# Patient Record
Sex: MALE | Race: WHITE | NOT HISPANIC OR LATINO
[De-identification: names, ages, dates, MRNs, and addresses within clinical notes are randomized per-mention and may not be internally consistent; named-entity substitution may affect disease eponyms.]

---

## 2021-08-12 ENCOUNTER — TRANSCRIPTION ENCOUNTER (OUTPATIENT)
Age: 46
End: 2021-08-12

## 2022-09-09 ENCOUNTER — NON-APPOINTMENT (OUTPATIENT)
Age: 47
End: 2022-09-09

## 2024-05-15 ENCOUNTER — NON-APPOINTMENT (OUTPATIENT)
Age: 49
End: 2024-05-15

## 2024-05-16 ENCOUNTER — EMERGENCY (EMERGENCY)
Facility: HOSPITAL | Age: 49
LOS: 0 days | Discharge: HOME | End: 2024-05-17
Attending: EMERGENCY MEDICINE
Payer: COMMERCIAL

## 2024-05-16 VITALS
RESPIRATION RATE: 18 BRPM | TEMPERATURE: 98 F | SYSTOLIC BLOOD PRESSURE: 161 MMHG | DIASTOLIC BLOOD PRESSURE: 79 MMHG | HEART RATE: 103 BPM | OXYGEN SATURATION: 99 % | WEIGHT: 175.93 LBS

## 2024-05-16 DIAGNOSIS — R00.2 PALPITATIONS: ICD-10-CM

## 2024-05-16 DIAGNOSIS — R79.89 OTHER SPECIFIED ABNORMAL FINDINGS OF BLOOD CHEMISTRY: ICD-10-CM

## 2024-05-16 LAB
ALBUMIN SERPL ELPH-MCNC: 4.7 G/DL — SIGNIFICANT CHANGE UP (ref 3.5–5.2)
ALP SERPL-CCNC: 69 U/L — SIGNIFICANT CHANGE UP (ref 30–115)
ALT FLD-CCNC: 25 U/L — SIGNIFICANT CHANGE UP (ref 0–41)
ANION GAP SERPL CALC-SCNC: 13 MMOL/L — SIGNIFICANT CHANGE UP (ref 7–14)
AST SERPL-CCNC: 18 U/L — SIGNIFICANT CHANGE UP (ref 0–41)
BASOPHILS # BLD AUTO: 0.06 K/UL — SIGNIFICANT CHANGE UP (ref 0–0.2)
BASOPHILS NFR BLD AUTO: 0.5 % — SIGNIFICANT CHANGE UP (ref 0–1)
BILIRUB SERPL-MCNC: 1.3 MG/DL — HIGH (ref 0.2–1.2)
BUN SERPL-MCNC: 10 MG/DL — SIGNIFICANT CHANGE UP (ref 10–20)
CALCIUM SERPL-MCNC: 9.8 MG/DL — SIGNIFICANT CHANGE UP (ref 8.4–10.4)
CHLORIDE SERPL-SCNC: 102 MMOL/L — SIGNIFICANT CHANGE UP (ref 98–110)
CO2 SERPL-SCNC: 25 MMOL/L — SIGNIFICANT CHANGE UP (ref 17–32)
CREAT SERPL-MCNC: 1 MG/DL — SIGNIFICANT CHANGE UP (ref 0.7–1.5)
EGFR: 92 ML/MIN/1.73M2 — SIGNIFICANT CHANGE UP
EOSINOPHIL # BLD AUTO: 0.05 K/UL — SIGNIFICANT CHANGE UP (ref 0–0.7)
EOSINOPHIL NFR BLD AUTO: 0.4 % — SIGNIFICANT CHANGE UP (ref 0–8)
GLUCOSE SERPL-MCNC: 89 MG/DL — SIGNIFICANT CHANGE UP (ref 70–99)
HCT VFR BLD CALC: 46.9 % — SIGNIFICANT CHANGE UP (ref 42–52)
HGB BLD-MCNC: 16.3 G/DL — SIGNIFICANT CHANGE UP (ref 14–18)
IMM GRANULOCYTES NFR BLD AUTO: 0.8 % — HIGH (ref 0.1–0.3)
LYMPHOCYTES # BLD AUTO: 1.38 K/UL — SIGNIFICANT CHANGE UP (ref 1.2–3.4)
LYMPHOCYTES # BLD AUTO: 11.6 % — LOW (ref 20.5–51.1)
MAGNESIUM SERPL-MCNC: 2.3 MG/DL — SIGNIFICANT CHANGE UP (ref 1.8–2.4)
MCHC RBC-ENTMCNC: 30.4 PG — SIGNIFICANT CHANGE UP (ref 27–31)
MCHC RBC-ENTMCNC: 34.8 G/DL — SIGNIFICANT CHANGE UP (ref 32–37)
MCV RBC AUTO: 87.5 FL — SIGNIFICANT CHANGE UP (ref 80–94)
MONOCYTES # BLD AUTO: 0.76 K/UL — HIGH (ref 0.1–0.6)
MONOCYTES NFR BLD AUTO: 6.4 % — SIGNIFICANT CHANGE UP (ref 1.7–9.3)
NEUTROPHILS # BLD AUTO: 9.55 K/UL — HIGH (ref 1.4–6.5)
NEUTROPHILS NFR BLD AUTO: 80.3 % — HIGH (ref 42.2–75.2)
NRBC # BLD: 0 /100 WBCS — SIGNIFICANT CHANGE UP (ref 0–0)
NT-PROBNP SERPL-SCNC: <36 PG/ML — SIGNIFICANT CHANGE UP (ref 0–300)
PLATELET # BLD AUTO: 245 K/UL — SIGNIFICANT CHANGE UP (ref 130–400)
PMV BLD: 10.9 FL — HIGH (ref 7.4–10.4)
POTASSIUM SERPL-MCNC: 4.2 MMOL/L — SIGNIFICANT CHANGE UP (ref 3.5–5)
POTASSIUM SERPL-SCNC: 4.2 MMOL/L — SIGNIFICANT CHANGE UP (ref 3.5–5)
PROT SERPL-MCNC: 7.3 G/DL — SIGNIFICANT CHANGE UP (ref 6–8)
RBC # BLD: 5.36 M/UL — SIGNIFICANT CHANGE UP (ref 4.7–6.1)
RBC # FLD: 12.4 % — SIGNIFICANT CHANGE UP (ref 11.5–14.5)
SODIUM SERPL-SCNC: 140 MMOL/L — SIGNIFICANT CHANGE UP (ref 135–146)
TROPONIN T, HIGH SENSITIVITY RESULT: 7 NG/L — SIGNIFICANT CHANGE UP (ref 6–21)
TROPONIN T, HIGH SENSITIVITY RESULT: 7 NG/L — SIGNIFICANT CHANGE UP (ref 6–21)
WBC # BLD: 11.9 K/UL — HIGH (ref 4.8–10.8)
WBC # FLD AUTO: 11.9 K/UL — HIGH (ref 4.8–10.8)

## 2024-05-16 PROCEDURE — 83735 ASSAY OF MAGNESIUM: CPT

## 2024-05-16 PROCEDURE — 83880 ASSAY OF NATRIURETIC PEPTIDE: CPT

## 2024-05-16 PROCEDURE — 84484 ASSAY OF TROPONIN QUANT: CPT

## 2024-05-16 PROCEDURE — 75574 CT ANGIO HRT W/3D IMAGE: CPT | Mod: MC

## 2024-05-16 PROCEDURE — 71045 X-RAY EXAM CHEST 1 VIEW: CPT

## 2024-05-16 PROCEDURE — 71045 X-RAY EXAM CHEST 1 VIEW: CPT | Mod: 26

## 2024-05-16 PROCEDURE — 93010 ELECTROCARDIOGRAM REPORT: CPT

## 2024-05-16 PROCEDURE — 99223 1ST HOSP IP/OBS HIGH 75: CPT

## 2024-05-16 PROCEDURE — G0378: CPT

## 2024-05-16 PROCEDURE — 99285 EMERGENCY DEPT VISIT HI MDM: CPT | Mod: 25

## 2024-05-16 PROCEDURE — 80053 COMPREHEN METABOLIC PANEL: CPT

## 2024-05-16 PROCEDURE — 36415 COLL VENOUS BLD VENIPUNCTURE: CPT

## 2024-05-16 PROCEDURE — 85025 COMPLETE CBC W/AUTO DIFF WBC: CPT

## 2024-05-16 PROCEDURE — 93005 ELECTROCARDIOGRAM TRACING: CPT

## 2024-05-16 RX ORDER — SODIUM CHLORIDE 9 MG/ML
1000 INJECTION, SOLUTION INTRAVENOUS ONCE
Refills: 0 | Status: COMPLETED | OUTPATIENT
Start: 2024-05-16 | End: 2024-05-16

## 2024-05-16 RX ADMIN — SODIUM CHLORIDE 1000 MILLILITER(S): 9 INJECTION, SOLUTION INTRAVENOUS at 16:17

## 2024-05-16 NOTE — ED CDU PROVIDER INITIAL DAY NOTE - OBJECTIVE STATEMENT
49-year-old male no past medical history had an episodes of fluttering in his chest yesterday decided to take his blood pressure noticed his heart rate was initially 160 and then repeated and was lower decided to go to an urgent care today and they sent him to the ED for further evaluation.

## 2024-05-16 NOTE — ED CDU PROVIDER INITIAL DAY NOTE - ATTENDING APP SHARED VISIT CONTRIBUTION OF CARE
49-year-old male no past medical history had an episodes of fluttering in his chest yesterday decided to take his blood pressure noticed his heart rate was initially 160 and then repeated and was lower decided to go to an urgent care today and they sent him to the ED for further evaluation.  Denies currently any chest pain shortness of breath nausea vomiting or abdominal pain non-smoker no previous cardiac workup vs reviewed labs imaging obtained and reviewed. ED EKG: my independent interpretation - Dr. Ton Stinson : [NSR, nl axis, nl intervals, no ST elevation] ED CXR prelim, my independent interpretation - Dr. Ton Stinson: [No PTX, No infiltrates, No Free air] high sensitivity troponin x 2 7. Patient placed in obs for further cardiac workup.  Plan for ccta in the morning   CONSTITUTIONAL: WA / WN / NAD  	HEAD: NCAT  	EYES: PERRL; EOMI;   	ENT: Normal pharynx; mucous membranes pink/moist, no erythema.  	NECK: Supple; no meningeal signs  	CARD: RRR; nl S1/S2; no M/R/G.  	RESP: Respiratory rate and effort are normal; breath sounds clear and equal bilaterally.  	ABD: Soft, NT ND   	MSK/EXT: No gross deformities; full range of motion.  	SKIN: Warm and dry;   	NEURO: AAOx3  PSYCH: Memory Intact, Normal Affect

## 2024-05-16 NOTE — ED PROVIDER NOTE - NSTIMEPROVIDERCAREINITIATE_GEN_ER
16-May-2024 15:15 I have personally performed a face to face diagnostic evaluation on this patient. I have reviewed the ACP note and agree with the history, exam and plan of care, except as noted.

## 2024-05-16 NOTE — ED CDU PROVIDER INITIAL DAY NOTE - CLINICAL SUMMARY MEDICAL DECISION MAKING FREE TEXT BOX
49-year-old male no past medical history had an episodes of fluttering in his chest yesterday decided to take his blood pressure noticed his heart rate was initially 160 and then repeated and was lower decided to go to an urgent care today and they sent him to the ED for further evaluation.  Denies currently any chest pain shortness of breath nausea vomiting or abdominal pain non-smoker no previous cardiac workup vs reviewed labs imaging obtained and reviewed. ED EKG: my independent interpretation - Dr. Ton Stinson : [NSR, nl axis, nl intervals, no ST elevation] ED CXR prelim, my independent interpretation - Dr. Ton Stinson: [No PTX, No infiltrates, No Free air] high sensitivity troponin x 2 7. Patient placed in obs for further cardiac workup.  Plan for ccta in the morning

## 2024-05-16 NOTE — ED ADULT TRIAGE NOTE - CHIEF COMPLAINT QUOTE
Patient in NAD a+ox3 states yesterday "felt fluttering in my chest for few seconds" and today had abnormal EKG at urgent care sent to ED

## 2024-05-16 NOTE — ED CDU PROVIDER INITIAL DAY NOTE - WHICH SHOWED
ED EKG: my independent interpretation - Dr. Ton Stinson : [NSR, nl axis, nl intervals, no ST elevation] ED CXR prelim, my independent interpretation - Dr. Ton Stinson: [No PTX, No infiltrates, No Free air]

## 2024-05-16 NOTE — ED PROVIDER NOTE - EKG/XRAY ADDITIONAL INFORMATION
ED CXR prelim, my independent interpretation - Dr. Ton Stinson: [No PTX, No infiltrates, No Free air] ED EKG: my independent interpretation - Dr. Ton Stinson : [NSR, nl axis, nl intervals, no ST elevation]

## 2024-05-16 NOTE — ED CDU PROVIDER INITIAL DAY NOTE - PHYSICAL EXAMINATION
CONSTITUTIONAL: WA / WN / NAD  	HEAD: NCAT  	EYES: PERRL; EOMI;   	ENT: Normal pharynx; mucous membranes pink/moist, no erythema.  	NECK: Supple; no meningeal signs  	CARD: RRR; nl S1/S2; no M/R/G.  	RESP: Respiratory rate and effort are normal; breath sounds clear and equal bilaterally.  	ABD: Soft, NT ND   	MSK/EXT: No gross deformities; full range of motion.  	SKIN: Warm and dry;   	NEURO: AAOx3  PSYCH: Memory Intact, Normal Affect

## 2024-05-16 NOTE — ED PROVIDER NOTE - OBJECTIVE STATEMENT
49-year-old male no past medical history had an episodes of fluttering in his chest yesterday decided to take his blood pressure noticed his heart rate was initially 160 and then repeated and was lower decided to go to an urgent care today and they sent him to the ED for further evaluation.  Denies currently any chest pain shortness of breath nausea vomiting or abdominal pain non-smoker no previous cardiac workup

## 2024-05-16 NOTE — ED PROVIDER NOTE - CLINICAL SUMMARY MEDICAL DECISION MAKING FREE TEXT BOX
49-year-old male no past medical history had an episodes of fluttering in his chest yesterday decided to take his blood pressure noticed his heart rate was initially 160 and then repeated and was lower decided to go to an urgent care today and they sent him to the ED for further evaluation.  Denies currently any chest pain shortness of breath nausea vomiting or abdominal pain non-smoker no previous cardiac workup vs reviewed labs imaging obtained and reviewed. ED EKG: my independent interpretation - Dr. Ton Stinson : [NSR, nl axis, nl intervals, no ST elevation] ED CXR prelim, my independent interpretation - Dr. Ton Stinson: [No PTX, No infiltrates, No Free air] high sensitivity troponin x 2 7. Patient placed in obs for further cardiac workup.

## 2024-05-17 VITALS — HEART RATE: 56 BPM

## 2024-05-17 PROBLEM — Z00.00 ENCOUNTER FOR PREVENTIVE HEALTH EXAMINATION: Status: ACTIVE | Noted: 2024-05-17

## 2024-05-17 PROCEDURE — 99239 HOSP IP/OBS DSCHRG MGMT >30: CPT

## 2024-05-17 PROCEDURE — 75574 CT ANGIO HRT W/3D IMAGE: CPT | Mod: 26,MC

## 2024-05-17 RX ORDER — METOPROLOL TARTRATE 50 MG
100 TABLET ORAL ONCE
Refills: 0 | Status: COMPLETED | OUTPATIENT
Start: 2024-05-17 | End: 2024-05-17

## 2024-05-17 RX ADMIN — Medication 100 MILLIGRAM(S): at 07:40

## 2024-05-17 NOTE — ED CDU PROVIDER DISPOSITION NOTE - PROVIDER TOKENS
PROVIDER:[TOKEN:[[id number]],FOLLOWUP:[1-3 Days]],PROVIDER:[TOKEN:[[id number]],FOLLOWUP:[4-6 Days]]

## 2024-05-17 NOTE — ED CDU PROVIDER DISPOSITION NOTE - CARE PROVIDER_API CALL
Nate Tran  Internal Medicine  [address], [address]  [place] [address]  Phone: [phone]  Fax: [phone]  Follow Up Time: 1-3 Days    Wade Cardenas  Interventional Cardiology  [address] [place] [address], [address]  [place] [address]  Phone: [phone]  Fax: [phone]  Follow Up Time: 4-6 Days

## 2024-05-17 NOTE — ED CDU PROVIDER DISPOSITION NOTE - CARE PROVIDERS DIRECT ADDRESSES
[email or web address]

## 2024-05-17 NOTE — ED CDU PROVIDER DISPOSITION NOTE - CLINICAL COURSE
pt presenting for eval of cp/palps. labs ekg imaging reviewed. Aware of all results, given a copy of all available results, comfortable with discharge and follow-up outpatient, strict return precautions given. Endorses understanding of all of this and aware that they can return at any time for new or concerning symptoms. No further questions or concerns at this time

## 2024-05-17 NOTE — ED CDU PROVIDER SUBSEQUENT DAY NOTE - PROGRESS NOTE DETAILS
Patient comfortable, awaiting ccta CCTA results noted. Patient already has f/u with Dr Cardenas set up in 2 weeks.

## 2024-05-17 NOTE — ED CDU PROVIDER DISPOSITION NOTE - PATIENT PORTAL LINK FT
You can access the FollowMyHealth Patient Portal offered by [facility] by registering at the following website: [email or web address]. By joining [facility]’s FollowMyHealth portal, you will also be able to view your health information using other applications (apps) compatible with our system.

## 2024-05-19 ENCOUNTER — NON-APPOINTMENT (OUTPATIENT)
Age: 49
End: 2024-05-19

## 2024-05-22 ENCOUNTER — APPOINTMENT (OUTPATIENT)
Dept: CARDIOLOGY | Facility: CLINIC | Age: 49
End: 2024-05-22
Payer: COMMERCIAL

## 2024-05-22 VITALS — BODY MASS INDEX: 26.98 KG/M2 | WEIGHT: 178 LBS | HEART RATE: 93 BPM | HEIGHT: 68 IN

## 2024-05-22 VITALS — DIASTOLIC BLOOD PRESSURE: 80 MMHG | RESPIRATION RATE: 18 BRPM | SYSTOLIC BLOOD PRESSURE: 130 MMHG

## 2024-05-22 DIAGNOSIS — B27.90 INFECTIOUS MONONUCLEOSIS, UNSPECIFIED W/OUT COMPLICATION: ICD-10-CM

## 2024-05-22 DIAGNOSIS — Z78.9 OTHER SPECIFIED HEALTH STATUS: ICD-10-CM

## 2024-05-22 DIAGNOSIS — Z82.49 FAMILY HISTORY OF ISCHEMIC HEART DISEASE AND OTHER DISEASES OF THE CIRCULATORY SYSTEM: ICD-10-CM

## 2024-05-22 PROCEDURE — 93242 EXT ECG>48HR<7D RECORDING: CPT

## 2024-05-22 PROCEDURE — 99204 OFFICE O/P NEW MOD 45 MIN: CPT

## 2024-05-22 PROCEDURE — 93000 ELECTROCARDIOGRAM COMPLETE: CPT

## 2024-05-22 NOTE — DISCUSSION/SUMMARY
[FreeTextEntry1] : DIET DISCUSSED IN DETAIL ECHO TO ASSESS FOR STRUCTURAL HEART DISEASE 72 HR MONITOR TO ASSESS FOR ARRHYTHMIA FOLLWOUP WITH PMD FOLLOWUP POST TEST  [EKG obtained to assist in diagnosis and management of assessed problem(s)] : EKG obtained to assist in diagnosis and management of assessed problem(s)

## 2024-05-22 NOTE — HISTORY OF PRESENT ILLNESS
[FreeTextEntry1] : 49 y.o. male with no significant PMH. [place] his heart "Fluttering". Went to ER. Had a CTA that was normal. Here for follow up.

## 2024-06-05 ENCOUNTER — APPOINTMENT (OUTPATIENT)
Dept: CARDIOLOGY | Facility: CLINIC | Age: 49
End: 2024-06-05
Payer: COMMERCIAL

## 2024-06-05 PROCEDURE — 93306 TTE W/DOPPLER COMPLETE: CPT

## 2024-06-06 DIAGNOSIS — R00.2 PALPITATIONS: ICD-10-CM

## 2024-08-12 ENCOUNTER — APPOINTMENT (OUTPATIENT)
Dept: CARDIOLOGY | Facility: CLINIC | Age: 49
End: 2024-08-12
Payer: COMMERCIAL

## 2024-08-12 VITALS — SYSTOLIC BLOOD PRESSURE: 120 MMHG | HEART RATE: 89 BPM | RESPIRATION RATE: 18 BRPM | DIASTOLIC BLOOD PRESSURE: 80 MMHG

## 2024-08-12 VITALS — HEART RATE: 89 BPM | RESPIRATION RATE: 18 BRPM

## 2024-08-12 VITALS — BODY MASS INDEX: 28.34 KG/M2 | HEIGHT: 68 IN | WEIGHT: 187 LBS

## 2024-08-12 DIAGNOSIS — R00.2 PALPITATIONS: ICD-10-CM

## 2024-08-12 PROCEDURE — 93000 ELECTROCARDIOGRAM COMPLETE: CPT

## 2024-08-12 PROCEDURE — 99213 OFFICE O/P EST LOW 20 MIN: CPT

## 2024-08-12 NOTE — HISTORY OF PRESENT ILLNESS
[FreeTextEntry1] : 49 y.o. male with no significant PMH. [employer] his heart "Fluttering". Went to ER. Had a CTA that was normal. Echo normal. Monitor 1 SVT 8 beats

## 2024-08-12 NOTE — DISCUSSION/SUMMARY
[EKG obtained to assist in diagnosis and management of assessed problem(s)] : EKG obtained to assist in diagnosis and management of assessed problem(s) [FreeTextEntry1] : DIET DISCUSSED IN DETAIL FOLLOWUP WITH PMD FOLLOWUP PRN

## 2024-08-12 NOTE — RESULTS/DATA
[TextEntry] : EKG NSR SHORT WA  CTA NORMAL   ECHO 1. Left ventricular cavity is normal in size. Left ventricular wall thickness is normal. Left ventricular systolic function is normal with an ejection fraction of 68 % by Mak's method of disks. 2. Trace mitral regurgitation. 3. Trace pulmonic regurgitation. 4. Trace tricuspid regurgitation.  HOLTER SVE's 0.01% 1 SVT 8 BEATS @ 195 PVC's ,0.01%